# Patient Record
Sex: FEMALE | Race: WHITE | NOT HISPANIC OR LATINO | Employment: OTHER | URBAN - NONMETROPOLITAN AREA
[De-identification: names, ages, dates, MRNs, and addresses within clinical notes are randomized per-mention and may not be internally consistent; named-entity substitution may affect disease eponyms.]

---

## 2017-02-16 ENCOUNTER — APPOINTMENT (OUTPATIENT)
Dept: CT IMAGING | Facility: HOSPITAL | Age: 82
End: 2017-02-16

## 2017-02-16 ENCOUNTER — HOSPITAL ENCOUNTER (EMERGENCY)
Facility: HOSPITAL | Age: 82
Discharge: HOME | End: 2017-02-16
Attending: FAMILY MEDICINE | Admitting: FAMILY MEDICINE

## 2017-02-16 ENCOUNTER — APPOINTMENT (OUTPATIENT)
Dept: GENERAL RADIOLOGY | Facility: HOSPITAL | Age: 82
End: 2017-02-16

## 2017-02-16 VITALS
OXYGEN SATURATION: 98 % | HEIGHT: 64 IN | BODY MASS INDEX: 16.73 KG/M2 | TEMPERATURE: 98.2 F | HEART RATE: 81 BPM | DIASTOLIC BLOOD PRESSURE: 68 MMHG | WEIGHT: 98 LBS | SYSTOLIC BLOOD PRESSURE: 145 MMHG | RESPIRATION RATE: 18 BRPM

## 2017-02-16 DIAGNOSIS — S52.501A RADIUS AND ULNA DISTAL FRACTURE, RIGHT, CLOSED, INITIAL ENCOUNTER: Primary | ICD-10-CM
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
## 2017-02-16 DIAGNOSIS — S52.601A RADIUS AND ULNA DISTAL FRACTURE, RIGHT, CLOSED, INITIAL ENCOUNTER: Primary | ICD-10-CM

## 2017-02-16 DIAGNOSIS — S02.2XXA CLOSED FRACTURE OF NASAL BONE, INITIAL ENCOUNTER: ICD-10-CM

## 2017-02-16 PROCEDURE — 70486 CT MAXILLOFACIAL W/O DYE: CPT

## 2017-02-16 PROCEDURE — 70450 CT HEAD/BRAIN W/O DYE: CPT

## 2017-02-16 PROCEDURE — 99284 EMERGENCY DEPT VISIT MOD MDM: CPT

## 2017-02-16 PROCEDURE — 73110 X-RAY EXAM OF WRIST: CPT

## 2017-02-16 PROCEDURE — 72125 CT NECK SPINE W/O DYE: CPT

## 2017-02-16 RX ORDER — TRAMADOL HYDROCHLORIDE 50 MG/1
50 TABLET ORAL ONCE
Status: COMPLETED | OUTPATIENT
Start: 2017-02-16 | End: 2017-02-16

## 2017-02-16 RX ORDER — BACITRACIN ZINC 500 [USP'U]/G
1 OINTMENT TOPICAL ONCE
Status: DISCONTINUED | OUTPATIENT
Start: 2017-02-16 | End: 2017-02-16 | Stop reason: HOSPADM

## 2017-02-16 RX ORDER — ACETAMINOPHEN 500 MG
1000 TABLET ORAL ONCE
Status: COMPLETED | OUTPATIENT
Start: 2017-02-16 | End: 2017-02-16

## 2017-02-16 RX ORDER — TRAMADOL HYDROCHLORIDE 50 MG/1
50 TABLET ORAL EVERY 8 HOURS PRN
Qty: 20 TABLET | Refills: 0 | Status: SHIPPED | OUTPATIENT
Start: 2017-02-16 | End: 2018-06-28

## 2017-02-16 RX ORDER — AMOXICILLIN 500 MG/1
500 CAPSULE ORAL ONCE
Status: COMPLETED | OUTPATIENT
Start: 2017-02-16 | End: 2017-02-16

## 2017-02-16 RX ORDER — AMOXICILLIN 500 MG/1
1000 CAPSULE ORAL 3 TIMES DAILY
Qty: 30 CAPSULE | Refills: 0 | Status: SHIPPED | OUTPATIENT
Start: 2017-02-16 | End: 2018-06-28

## 2017-02-16 RX ADMIN — TRAMADOL HYDROCHLORIDE 50 MG: 50 TABLET, FILM COATED ORAL at 19:01

## 2017-02-16 RX ADMIN — AMOXICILLIN 500 MG: 500 CAPSULE ORAL at 19:01

## 2017-02-16 RX ADMIN — ACETAMINOPHEN 1000 MG: 500 TABLET ORAL at 19:01

## 2017-02-16 NOTE — ED PROVIDER NOTES
Subjective   HPI Comments: Presents per ems after fall while walking outside, onto [place]. Daughter present. States no LOC, she has dementia and not acting different from her nml. No vomiting.    Patient is a 89 y.o. female presenting with fall.   History provided by: daughter.  History limited by:  Dementia  Fall   Mechanism of injury: fall    Injury location:  Head/neck  Head/neck injury location:  Head  Arrived directly from scene: yes    Fall:     Fall occurred:  Walking    Impact surface:  [place]      Review of Systems   Constitutional: Negative.    Eyes: Negative.    Respiratory: Negative.    Cardiovascular: Negative.    Gastrointestinal: Negative.    Genitourinary: Negative.    Musculoskeletal: Positive for joint swelling.   Skin: Positive for wound.   Neurological: Negative.    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    • Shoulder pain        Allergies   Allergen Reactions   • Morphine And Related        Past Surgical History   Procedure Laterality Date   • Cataract extraction  01/11/2010     AFTER CATARACT LASER SURGERY 38451 (1)    YAG laser capsulotomy, right eye    • Other surgical history  04/17/2013     EXTENDED VISUAL FIELDS STUDY 08746 (Glaucoma, primary open angle)    • Injection of medication  09/21/2012     KENALOG (1)   • Other surgical history  01/24/2014     OCT DISC NFL 25371 (Primary open angle glaucoma   • Other surgical history  01/02/2013      "OCT SCAN RETINA 94354 (Macular degeneration)    • Pap smear  03/09/1994     PAP SMEAR (normal)   • Cataract extraction  06/14/2005     Remove cataract, insert lens (Cataract extraction with intraocular lens implantation, right eye)   • Cataract extraction  03/22/2005     Remove cataract, insert lens (cataract extraction with intraocular lens implantation, left eye)   • Other surgical history  08/27/2015     Remove Impacted Cerumen 10332 (Impacted cerumen) (5)   • Injection of medication  12/12/2014     Toradol (1)          History reviewed. No pertinent family history.    Social History     Social History   • Marital status:      Spouse name: N/A   • Number of children: N/A   • Years of education: N/A     Social History Main Topics   • Smoking status: Never Smoker   • Smokeless tobacco: None   • Alcohol use None   • Drug use: None   • Sexual activity: Not Asked     Other Topics Concern   • None     Social History Narrative           Objective   Physical Exam   Constitutional: She is oriented to person, place, and time. She appears well-developed and well-nourished.   HENT:   Head: Normocephalic.   Abrasions to face, right head/nose, right eye with ecchymosis and edema   Eyes: EOM are normal. Pupils are equal, round, and reactive to light.   Neck: Normal range of motion. Neck supple.   Cardiovascular: Normal rate.    Pulmonary/Chest: Effort normal and breath sounds normal.   Abdominal: Soft. Bowel sounds are normal.   Neurological: She is alert and oriented to person, place, and time.   Skin: Skin is warm and dry.   Nursing note and vitals reviewed.    Visit Vitals   • /86   • Pulse 83   • Temp 98.2 °F (36.8 °C) (Oral)   • Resp 18   • Ht 64\" (162.6 cm)   • Wt 98 lb (44.5 kg)   • SpO2 91%   • BMI 16.82 kg/m2         Splint Application  Date/Time: 2/16/2017 7:51 PM  Performed by: LINDA DANIELS  Authorized by: RAYMUNDO ZHENG   Consent: Verbal consent obtained.  Consent given by: guardian  Location " details: right arm  Splint type: sugar tong  Supplies used: cotton padding,  elastic bandage and Ortho-Glass  Post-procedure: The splinted body part was neurovascularly unchanged following the procedure.  Patient tolerance: Patient tolerated the procedure well with no immediate complications               ED Course  ED Course      CT Cervical Spine Without Contrast   Final Result   CONCLUSION:       1.  Osteoporosis.2.  Multilevel degenerative disc disease and   degenerative arthropathy of the cervical spine. 3.    Anterolisthesis at C3-4 and C4-5. 4.  Mild old compression   fractures of C5, C6 and C7.      Electronically signed by:  Christelle Ramsey MD  2/16/2017 5:49 PM CST   Workstation: [employer]      CT Head Without Contrast   Final Result   1.  No evidence of intracranial hemorrhage, mass effect or large acute infarct.   2.  Subcutaneous hematoma visualized anterior to the frontal bones, asymmetric to the right, as well as soft tissue swelling overlying the right orbit and nasal bridge.    3.  Mildly comminuted nasal bone fracture.      Electronically signed by:  Carlos Bennett MD  2/16/2017 5:24 PM CST         CT Facial Bones Without Contrast   Final Result   CONCLUSION:     1.  Mildly comminuted nasal bone fracture with overlying soft tissue swelling.   2.  Soft tissue swelling overlying the right orbit and frontal subcutaneous hematoma asymmetric to the right.   3.  Paranasal sinus disease as described above.   4.  Severe multilevel degenerative changes of the cervical spine.      Electronically signed by:  Carlos Bennett MD  2/16/2017 5:21 PM CST         XR Wrist 3+ View Right   Final Result   Conclusion: Comminuted slightly impacted fracture distal radius with intra-articular extension. Nondisplaced fracture ulnar styloid.      Electronically signed by:  Eugene Hawkins  2/16/2017 4:29 PM CST                         MDM  Number of Diagnoses or Management Options  Closed fracture of nasal bone, initial encounter:    Fall, initial encounter:   Radius and ulna distal fracture, right, closed, initial encounter:   Diagnosis management comments: RIZWANA [id number]  D/W Dr Quintero pt 's fractures, will give amoxicillin due to fx nasal bones. She will follow up with ortho and ENT. Sugar tong splint for right wrist.      Final diagnoses:   Radius and ulna distal fracture, right, closed, initial encounter   Closed fracture of nasal bone, initial encounter   Fall, initial encounter            Patti Beal, APRN  02/16/17 0696

## 2017-02-17 NOTE — DISCHARGE INSTRUCTIONS
Cold pack to face as tolerated 3 - 4 times daily.  Pain med as needed.  Follow up with ENT and ORTHO

## 2017-02-24 ENCOUNTER — OFFICE VISIT (OUTPATIENT)
Dept: ORTHOPEDIC SURGERY | Facility: CLINIC | Age: 82
End: 2017-02-24

## 2017-02-24 VITALS — WEIGHT: 98 LBS | HEIGHT: 64 IN | BODY MASS INDEX: 16.73 KG/M2

## 2017-02-24 DIAGNOSIS — S52.609A: Primary | ICD-10-CM

## 2017-02-24 DIAGNOSIS — S52.509A: Primary | ICD-10-CM

## 2017-02-24 PROCEDURE — 99203 OFFICE O/P NEW LOW 30 MIN: CPT | Performed by: NURSE PRACTITIONER

## 2017-02-24 PROCEDURE — 25600 CLTX DST RDL FX/EPHYS SEP WO: CPT | Performed by: NURSE PRACTITIONER

## 2017-02-24 NOTE — PROGRESS NOTES
Marisa Luke is a 89 y.o. female   Primary provider:  Isaías Amador MD       Chief Complaint   Patient presents with   • Right Wrist - Establish Care, Pain   • Results     repeat xrays done today in office       HISTORY OF PRESENT ILLNESS:    Pain   This is a new problem. The current episode started in the past 7 days. The problem occurs constantly. The problem has been unchanged. Nothing aggravates the symptoms. She has tried nothing for the symptoms. The treatment provided mild relief.        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    • Shoulder pain        Allergies   Allergen Reactions   • Morphine And Related          Current Outpatient Prescriptions:   •  acetaminophen (TYLENOL) 325 MG tablet, Take 650 mg by mouth Every 4 (Four) Hours As Needed for mild pain (1-3)., Disp: , Rfl:   •  amoxicillin (AMOXIL) 500 MG capsule, Take 2 capsules by mouth 3 (Three) Times a Day., Disp: 30 capsule, Rfl: 0  •  cycloSPORINE (RESTASIS) 0.05 % ophthalmic emulsion, Administer 1 drop to both eyes 2 (Two) Times a Day., Disp: , Rfl:   •  docusate sodium (COLACE) 100 MG capsule, Take 100 mg by mouth 2 (Two) Times a Day., Disp: , Rfl:   •  dorzolamide-timolol (COSOPT) 22.3-6.8 MG/ML ophthalmic solution, Administer 1 drop to both eyes 2 (Two) Times a Day., Disp: , Rfl:   •  ferrous sulfate 325 (65 FE) MG tablet, Take 325 mg by mouth Daily., Disp: , Rfl:   •  latanoprost (XALATAN) 0.005 % ophthalmic solution,  Administer 1 drop to both eyes Every Night., Disp: , Rfl:   •  LevoFLOXacin (LEVAQUIN PO), Take 1 tablet by mouth Daily., Disp: , Rfl:   •  LORazepam (ATIVAN) 0.5 MG tablet, Take 0.5 mg by mouth 2 (Two) Times a Day., Disp: , Rfl:   •  Multiple Vitamins-Minerals (PRESERVISION AREDS PO), Take 1 tablet by mouth 2 (Two) Times a Day., Disp: , Rfl:   •  propranolol (INDERAL) 20 MG tablet, Take 20 mg by mouth 2 (Two) Times a Day., Disp: , Rfl:   •  traMADol (ULTRAM) 50 MG tablet, Take 1 tablet by mouth Every 8 (Eight) Hours As Needed for moderate pain (4-6)., Disp: 20 tablet, Rfl: 0  •  traZODone (DESYREL) 50 MG tablet, Take 50 mg by mouth At Night As Needed for sleep., Disp: , Rfl:     Past Surgical History   Procedure Laterality Date   • Cataract extraction  01/11/2010     AFTER CATARACT LASER SURGERY 95043 (1)    YAG laser capsulotomy, right eye    • Other surgical history  04/17/2013     EXTENDED VISUAL FIELDS STUDY 72309 (Glaucoma, primary open angle)    • Injection of medication  09/21/2012     KENALOG (1)   • Other surgical history  01/24/2014     OCT DISC NFL 87969 (Primary open angle glaucoma   • Other surgical history  01/02/2013     OCT SCAN RETINA 62889 (Macular degeneration)    • Pap smear  03/09/1994     PAP SMEAR (normal)   • Cataract extraction  06/14/2005     Remove cataract, insert lens (Cataract extraction with intraocular lens implantation, right eye)   • Cataract extraction  03/22/2005     Remove cataract, insert lens (cataract extraction with intraocular lens implantation, left eye)   • Other surgical history  08/27/2015     Remove Impacted Cerumen 43268 (Impacted cerumen) (5)   • Injection of medication  12/12/2014     Toradol (1)          History reviewed. No pertinent family history.     Social History     Social History   • Marital status:      Spouse name: N/A   • Number of children: N/A   • Years of education: N/A     Occupational History   • Not on file.     Social History Main Topics  "  • Smoking status: Never Smoker   • Smokeless tobacco: Not on file   • Alcohol use Not on file   • Drug use: Not on file   • Sexual activity: Not on file     Other Topics Concern   • Not on file     Social History Narrative        Review of Systems   Psychiatric/Behavioral: Positive for decreased concentration and sleep disturbance.   All other systems reviewed and are negative.      PHYSICAL EXAMINATION:       Visit Vitals   • Ht 64\" (162.6 cm)   • Wt 98 lb (44.5 kg)   • BMI 16.82 kg/m2       Physical Exam   Constitutional: She is oriented to person, place, and time. Vital signs are normal. She appears well-developed and well-nourished. She is cooperative.   HENT:   Head: Normocephalic and atraumatic.   Neck: Trachea normal and phonation normal.   Pulmonary/Chest: Effort normal. No respiratory distress.   Abdominal: Soft. Normal appearance. She exhibits no distension.   Neurological: She is alert and oriented to person, place, and time. GCS eye subscore is 4. GCS verbal subscore is 5. GCS motor subscore is 6.   Skin: Skin is warm, dry and intact.   Psychiatric: She has a normal mood and affect. Her speech is normal and behavior is normal. Judgment and thought content normal. Cognition and memory are normal.   Vitals reviewed.      GAIT:     [x]  Normal  []  Antalgic    Assistive device: [x]  None  []  Walker     []  Crutches  []  Cane     []  Wheelchair  []  Stretcher    Right Hand Exam     Tenderness   The patient is experiencing tenderness in the radial area and ulnar area.    Range of Motion     Wrist   Extension: abnormal   Flexion: abnormal   Pronation: abnormal   Supination: abnormal     Muscle Strength   Right wrist normal muscle strength: deferred.    Other   Erythema: absent  Scars: absent  Sensation: normal  Pulse: present      Left Hand Exam   Left hand exam is normal.              Xr Wrist 3+ View Right    Result Date: 2/24/2017  Narrative: AP lateral and oblique view of the wrist reveals a displaced " distal radial and ulnar styloid fracture without any other acute bony abnormality noted.  Comparisons made to previous films from last week which reveal no change in the fracture alignment.    Xr Wrist 3+ View Right    Result Date: 2/16/2017  Narrative: Procedure:  Right wrist. Indication:  Right wrist pain. Trauma, patient fell Technique:  Four views. Prior relevant exam:  None. Comminuted slightly impacted closed traumatic fracture distal radius with intra-articular extension. Nondisplaced fracture ulnar styloid.     Impression: Conclusion: Comminuted slightly impacted fracture distal radius with intra-articular extension. Nondisplaced fracture ulnar styloid. Electronically signed by:  Eugene Hawkins  2/16/2017 4:29 PM CST             ASSESSMENT:    Diagnoses and all orders for this visit:    Radius and ulna distal fracture, unspecified laterality, closed, initial encounter          PLAN  Recommend conservative tx of the distal radial fracture and a fiberglass short arm cast was applied.  Recommended f/u in3 week for recheck and repeat xrays in cast.   No Follow-up on file.    Rex Lopez, APRN

## 2017-03-15 ENCOUNTER — OFFICE VISIT (OUTPATIENT)
Dept: ORTHOPEDIC SURGERY | Facility: CLINIC | Age: 82
End: 2017-03-15

## 2017-03-15 VITALS — HEIGHT: 64 IN | BODY MASS INDEX: 16.73 KG/M2 | WEIGHT: 98 LBS

## 2017-03-15 DIAGNOSIS — S52.509D: Primary | ICD-10-CM

## 2017-03-15 DIAGNOSIS — S52.609D: Primary | ICD-10-CM

## 2017-03-15 PROCEDURE — 99024 POSTOP FOLLOW-UP VISIT: CPT | Performed by: NURSE PRACTITIONER

## 2017-03-15 NOTE — PROGRESS NOTES
Marisa Luke is a 89 y.o. female returns for     Chief Complaint   Patient presents with   • Right Wrist - Fracture     Repeat xray today in office.        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    • Shoulder pain        Allergies   Allergen Reactions   • Morphine And Related          Current Outpatient Prescriptions:   •  acetaminophen (TYLENOL) 325 MG tablet, Take 650 mg by mouth Every 4 (Four) Hours As Needed for mild pain (1-3)., Disp: , Rfl:   •  amoxicillin (AMOXIL) 500 MG capsule, Take 2 capsules by mouth 3 (Three) Times a Day., Disp: 30 capsule, Rfl: 0  •  cycloSPORINE (RESTASIS) 0.05 % ophthalmic emulsion, Administer 1 drop to both eyes 2 (Two) Times a Day., Disp: , Rfl:   •  docusate sodium (COLACE) 100 MG capsule, Take 100 mg by mouth 2 (Two) Times a Day., Disp: , Rfl:   •  dorzolamide-timolol (COSOPT) 22.3-6.8 MG/ML ophthalmic solution, Administer 1 drop to both eyes 2 (Two) Times a Day., Disp: , Rfl:   •  ferrous sulfate 325 (65 FE) MG tablet, Take 325 mg by mouth Daily., Disp: , Rfl:   •  latanoprost (XALATAN) 0.005 % ophthalmic solution, Administer 1 drop to both eyes Every Night., Disp: , Rfl:   •  LevoFLOXacin (LEVAQUIN PO), Take 1 tablet by mouth Daily., Disp: , Rfl:   •  LORazepam (ATIVAN) 0.5 MG tablet, Take 0.5 mg by mouth 2 (Two) Times a Day., Disp: , Rfl:   •  Multiple Vitamins-Minerals (PRESERVISION AREDS PO), Take 1 tablet by mouth 2  "(Two) Times a Day., Disp: , Rfl:   •  propranolol (INDERAL) 20 MG tablet, Take 20 mg by mouth 2 (Two) Times a Day., Disp: , Rfl:   •  traMADol (ULTRAM) 50 MG tablet, Take 1 tablet by mouth Every 8 (Eight) Hours As Needed for moderate pain (4-6)., Disp: 20 tablet, Rfl: 0  •  traZODone (DESYREL) 50 MG tablet, Take 50 mg by mouth At Night As Needed for sleep., Disp: , Rfl:     Past Surgical History   Procedure Laterality Date   • Cataract extraction  01/11/2010     AFTER CATARACT LASER SURGERY 52834 (1)    YAG laser capsulotomy, right eye    • Other surgical history  04/17/2013     EXTENDED VISUAL FIELDS STUDY 07917 (Glaucoma, primary open angle)    • Injection of medication  09/21/2012     KENALOG (1)   • Other surgical history  01/24/2014     OCT DISC NFL 97553 (Primary open angle glaucoma   • Other surgical history  01/02/2013     OCT SCAN RETINA 43941 (Macular degeneration)    • Pap smear  03/09/1994     PAP SMEAR (normal)   • Cataract extraction  06/14/2005     Remove cataract, insert lens (Cataract extraction with intraocular lens implantation, right eye)   • Cataract extraction  03/22/2005     Remove cataract, insert lens (cataract extraction with intraocular lens implantation, left eye)   • Other surgical history  08/27/2015     Remove Impacted Cerumen 47356 (Impacted cerumen) (5)   • Injection of medication  12/12/2014     Toradol (1)          ROS  No fevers or chills.  No chest pain or shortness of air.  No GI or  disturbances.    PHYSICAL EXAMINATION:       Visit Vitals   • Ht 64\" (162.6 cm)   • Wt 98 lb (44.5 kg)   • BMI 16.82 kg/m2       Physical Exam   Constitutional: Vital signs are normal. She appears well-developed and well-nourished. She is cooperative.   HENT:   Head: Normocephalic and atraumatic.   Neck: Trachea normal and phonation normal.   Pulmonary/Chest: Effort normal. No respiratory distress.   Abdominal: Soft. Normal appearance. She exhibits no distension.   Neurological: She is alert. She " is disoriented. GCS eye subscore is 4. GCS verbal subscore is 5. GCS motor subscore is 6.   Skin: Skin is warm, dry and intact.   Psychiatric: She has a normal mood and affect. Her speech is normal and behavior is normal. Judgment and thought content normal. Cognition and memory are normal.   Vitals reviewed.      GAIT:     []  Normal  []  Antalgic    Assistive device: []  None  []  Walker     []  Crutches  []  Cane     []  Wheelchair  []  Stretcher    Right Hand Exam     Comments:  Cast in place no neurovascular compromise noted.      Left Hand Exam   Left hand exam is normal.              Xr Wrist 3+ View Right    Result Date: 3/16/2017  Narrative: AP lateral oblique view of the wrist reveals a displaced distal radial fracture without any other acute bony abnormality noted.    Xr Wrist 3+ View Right    Result Date: 2/24/2017  Narrative: AP lateral and oblique view of the wrist reveals a displaced distal radial and ulnar styloid fracture without any other acute bony abnormality noted.  Comparisons made to previous films from last week which reveal no change in the fracture alignment.    Xr Wrist 3+ View Right    Result Date: 2/16/2017  Narrative: Procedure:  Right wrist. Indication:  Right wrist pain. Trauma, patient fell Technique:  Four views. Prior relevant exam:  None. Comminuted slightly impacted closed traumatic fracture distal radius with intra-articular extension. Nondisplaced fracture ulnar styloid.     Impression: Conclusion: Comminuted slightly impacted fracture distal radius with intra-articular extension. Nondisplaced fracture ulnar styloid. Electronically signed by:  Eugene Hawkins  2/16/2017 4:29 PM CST     Ct Head Without Contrast    Result Date: 2/16/2017  Narrative: [facility] [place] Patient Name: MS. OMER BOOTHE ORDERING: LINDA DANIELS ATTENDING: RAYMUNDO ZHENG REFERRING: LINDA DANIELS ----------------------- EXAMINATION:  CT SCAN OF THE HEAD WITHOUT INTRAVENOUS CONTRAST CLINICAL  INFORMATION:  fall with head injury DOSE LENGTH PRODUCT: 2051.8 This exam was performed using radiation doses that are as low as reasonably achievable (ALARA). COMPARISON: None available. TECHNIQUE:  Axial images from skull base to vertex.  FINDINGS: Exam is limited due to patient motion artifact and patient positioning. Foci of decreased attenuation are seen in the periventricular white matter, likely due to microvascular ischemia. There are involutional changes involving the cerebral sulci and ventricles. Subcutaneous hematoma visualized anterior to the frontal bones, asymmetric to the right, as well as soft tissue swelling overlying the right orbit and nasal bridge. Mildly comminuted nasal bone fracture. There is no evidence of intracranial hemorrhage, parenchymal mass, midline shift, or focal mass effect. There is no hydrocephalus or effacement of the basilar cisterns. There is no extra-axial hemorrhage or collection identified. The mastoid air cells and visualized paranasal sinuses appear clear.       Impression: 1.  No evidence of intracranial hemorrhage, mass effect or large acute infarct. 2.  Subcutaneous hematoma visualized anterior to the frontal bones, asymmetric to the right, as well as soft tissue swelling overlying the right orbit and nasal bridge. 3.  Mildly comminuted nasal bone fracture. Electronically signed by:  Carlos Bennett MD  2/16/2017 5:24 PM CST     Ct Cervical Spine Without Contrast    Result Date: 2/16/2017  Narrative: Patient Name:  MS. OMER WHEAT CROWEPatient ID:  [id number] Ordering: LINDA DANIELSAttending:  RAYMUNDO ZHENGReferring:  LINDA DANIELS------------------------------------------------DATE OF PROCEDURE:  2/16/2017 4:52 PM CST CERVICAL SPINE CT INDICATION FOR PROCEDURE: A   89 years-old patient presents for evaluation of neck pain after fall.. TECHNIQUE: Contiguous axial images were obtained from the skull base to T3.  Multiplanar reformations are submitted for interpretation.  Dose length product is 172  Images were acquired in accordance with the principles of ALARA (as low as reasonably allowable). COMPARISON: CT of the cervical spine dated October 11, 2015. FINDINGS: The bones appear osteopenic.  There is mild compression of the C5, C6 and C7 vertebral bodies, similar to the previous CT. There is normal lordosis. Intervertebral disc spaces are narrowed at C5-6 and C6-7 discs. There is a mild anterolisthesis at C3-4 and C4-5. There are degenerative changes of the anterior atlantoaxial articulation. Atlanto-axial and atlanto-occipital relationships are within normal limits.  The neuroforamina are narrowed at multiple levels. There is multilevel degenerative arthropathy of the uncovertebral and facet joints.. Nasopharynx, oropharynx, hypopharynx and larynx have a normal appearance.  Lung apices are clear. There is heterogeneous attenuation of thyroid.     Impression: CONCLUSION: 1.  Osteoporosis.2.  Multilevel degenerative disc disease and degenerative arthropathy of the cervical spine. 3. Anterolisthesis at C3-4 and C4-5. 4.  Mild old compression fractures of C5, C6 and C7. Electronically signed by:  Christelle Ramsey MD  2/16/2017 5:49 PM CST Workstation: [id number]    Ct Facial Bones Without Contrast    Result Date: 2/16/2017  Narrative: [facility] Patient Name: OMER BOOTHE ORDERING: LINDA DANIELS ATTENDING: RAYMUNDO ZHENG REFERRING: LINDA DANIELS ----------------------- PROCEDURE: CT facial bones without contrast COMPARISON: Head CT May 25, 2016 HISTORY: Injury, pain TECHNIQUE: Multiple contiguous noncontrast axial images are obtained through the facial bones with coronal and sagittal multiplanar reformatted images also reviewed. The dose length product is 171.8 FINDINGS: There is a mildly comminuted irregular fracture of the nasal bones with overlying soft tissue swelling. There are severe multilevel degenerative changes of the cervical spine. There is soft tissue  swelling overlying the right orbit. There is a frontal subcutaneous hematoma, asymmetric to the right. Mild mucoperiosteal thickening affects the bilateral maxillary sinuses and ethmoid air cells.     Impression: CONCLUSION:  1.  Mildly comminuted nasal bone fracture with overlying soft tissue swelling. 2.  Soft tissue swelling overlying the right orbit and frontal subcutaneous hematoma asymmetric to the right. 3.  Paranasal sinus disease as described above. 4.  Severe multilevel degenerative changes of the cervical spine. Electronically signed by:  Carlos Bennett MD  2/16/2017 5:21 PM CST             ASSESSMENT:    Diagnoses and all orders for this visit:    Radius and ulna distal fracture, unspecified laterality, closed, with routine healing, subsequent encounter          PLAN  Recommend that the patient remain in the cast for 3 more weeks and follow-up for x-rays out of the cast at the next visit.  No Follow-up on file.    Rex Lopez, APRN

## 2017-04-05 ENCOUNTER — OFFICE VISIT (OUTPATIENT)
Dept: ORTHOPEDIC SURGERY | Facility: CLINIC | Age: 82
End: 2017-04-05

## 2017-04-05 DIAGNOSIS — S52.601D RADIUS AND ULNA DISTAL FRACTURE, RIGHT, CLOSED, WITH ROUTINE HEALING, SUBSEQUENT ENCOUNTER: Primary | ICD-10-CM

## 2017-04-05 DIAGNOSIS — S52.501D RADIUS AND ULNA DISTAL FRACTURE, RIGHT, CLOSED, WITH ROUTINE HEALING, SUBSEQUENT ENCOUNTER: Primary | ICD-10-CM

## 2017-04-05 PROCEDURE — 99024 POSTOP FOLLOW-UP VISIT: CPT | Performed by: NURSE PRACTITIONER

## 2017-04-05 PROCEDURE — 29085 APPL CAST HAND&LWR FOREARM: CPT | Performed by: NURSE PRACTITIONER

## 2017-04-05 NOTE — PROGRESS NOTES
Marisa Luke is a 89 y.o. female returns for     Chief Complaint   Patient presents with   • Right Wrist - Fracture   • Cast Removal       HISTORY OF PRESENT ILLNESS: cast removed repeat xrays done today.        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    • Essential hypertension    • Fracture of thoracic spine      OLD THORACICCOMPRESSION FRACTURES      • Hearing loss    • Impacted cerumen    • Keratoconjunctivitis sicca     - exacerbated      • Low back strain    • Nonexudative age-related macular degeneration    • Pain in eye    • Primary open angle glaucoma    • Prolapse of vaginal walls without uterine prolapse    • Shoulder pain        Allergies   Allergen Reactions   • Morphine And Related          Current Outpatient Prescriptions:   •  acetaminophen (TYLENOL) 325 MG tablet, Take 650 mg by mouth Every 4 (Four) Hours As Needed for mild pain (1-3)., Disp: , Rfl:   •  amoxicillin (AMOXIL) 500 MG capsule, Take 2 capsules by mouth 3 (Three) Times a Day., Disp: 30 capsule, Rfl: 0  •  cycloSPORINE (RESTASIS) 0.05 % ophthalmic emulsion, Administer 1 drop to both eyes 2 (Two) Times a Day., Disp: , Rfl:   •  docusate sodium (COLACE) 100 MG capsule, Take 100 mg by mouth 2 (Two) Times a Day., Disp: , Rfl:   •  dorzolamide-timolol (COSOPT) 22.3-6.8 MG/ML ophthalmic solution, Administer 1 drop to both eyes 2 (Two) Times a Day., Disp: , Rfl:   •  ferrous sulfate 325 (65 FE) MG tablet, Take 325 mg by mouth Daily., Disp: , Rfl:   •  latanoprost (XALATAN) 0.005 % ophthalmic solution, Administer 1 drop to both eyes Every Night., Disp: , Rfl:   •  LevoFLOXacin (LEVAQUIN PO), Take 1 tablet by mouth Daily., Disp: , Rfl:   •  LORazepam (ATIVAN) 0.5 MG tablet, Take 0.5 mg by mouth 2 (Two) Times a Day., Disp: , Rfl:   •  Multiple Vitamins-Minerals (PRESERVISION AREDS PO), Take 1  tablet by mouth 2 (Two) Times a Day., Disp: , Rfl:   •  propranolol (INDERAL) 20 MG tablet, Take 20 mg by mouth 2 (Two) Times a Day., Disp: , Rfl:   •  traMADol (ULTRAM) 50 MG tablet, Take 1 tablet by mouth Every 8 (Eight) Hours As Needed for moderate pain (4-6)., Disp: 20 tablet, Rfl: 0  •  traZODone (DESYREL) 50 MG tablet, Take 50 mg by mouth At Night As Needed for sleep., Disp: , Rfl:     Past Surgical History:   Procedure Laterality Date   • CATARACT EXTRACTION  01/11/2010    AFTER CATARACT LASER SURGERY 85665 (1)    YAG laser capsulotomy, right eye    • CATARACT EXTRACTION  06/14/2005    Remove cataract, insert lens (Cataract extraction with intraocular lens implantation, right eye)   • CATARACT EXTRACTION  03/22/2005    Remove cataract, insert lens (cataract extraction with intraocular lens implantation, left eye)   • INJECTION OF MEDICATION  09/21/2012    KENALOG (1)   • INJECTION OF MEDICATION  12/12/2014    Toradol (1)      • OTHER SURGICAL HISTORY  04/17/2013    EXTENDED VISUAL FIELDS STUDY 13310 (Glaucoma, primary open angle)    • OTHER SURGICAL HISTORY  01/24/2014    OCT DISC NFL 10440 (Primary open angle glaucoma   • OTHER SURGICAL HISTORY  01/02/2013    OCT SCAN RETINA 32387 (Macular degeneration)    • OTHER SURGICAL HISTORY  08/27/2015    Remove Impacted Cerumen 01969 (Impacted cerumen) (5)   • PAP SMEAR  03/09/1994    PAP SMEAR (normal)       ROS  No fevers or chills.  No chest pain or shortness of air.  No GI or  disturbances.    PHYSICAL EXAMINATION:       There were no vitals taken for this visit.    Physical Exam   Constitutional: Vital signs are normal. She appears well-developed and well-nourished. She is cooperative.   HENT:   Head: Normocephalic and atraumatic.   Neck: Trachea normal and phonation normal.   Pulmonary/Chest: Effort normal. No respiratory distress.   Abdominal: Soft. Normal appearance. She exhibits no distension.   Neurological: She is alert. She is disoriented. GCS eye  subscore is 4. GCS verbal subscore is 5. GCS motor subscore is 6.   Skin: Skin is warm, dry and intact.   Psychiatric: She has a normal mood and affect. Her speech is normal and behavior is normal. Judgment and thought content normal. Cognition and memory are normal.   Vitals reviewed.      GAIT:     []  Normal  []  Antalgic    Assistive device: [x]  None  []  Walker     []  Crutches  []  Cane     []  Wheelchair  []  Stretcher    Right Hand Exam     Tenderness   The patient is experiencing no tenderness.         Range of Motion     Wrist   Extension: abnormal   Flexion: abnormal   Pronation: abnormal   Supination: abnormal     Muscle Strength   Right wrist normal muscle strength: deferred.    Other   Erythema: absent  Scars: absent  Sensation: normal  Pulse: present      Left Hand Exam   Left hand exam is normal.              Xr Wrist 3+ View Right    Result Date: 4/5/2017  Narrative: AP lateral oblique view of the right wrist reveals a stable distal radial fracture.    Xr Wrist 3+ View Right    Result Date: 3/16/2017  Narrative: AP lateral oblique view of the wrist reveals a displaced distal radial fracture without any other acute bony abnormality noted.            ASSESSMENT:    Diagnoses and all orders for this visit:    Radius and ulna distal fracture, right, closed, with routine healing, subsequent encounter          PLAN  I recommend splinting of the right wrist at this time with a wrist controlled splint.  Splint was applied in the office today before the patient left and the daughter was instructed on continued care.  She verbalizes understanding of the care and the discharge instruction.  Recommend follow-up in 4-6 weeks for repeat x-ray.  No Follow-up on file.    SHADIA Flores

## 2017-05-10 ENCOUNTER — OFFICE VISIT (OUTPATIENT)
Dept: ORTHOPEDIC SURGERY | Facility: CLINIC | Age: 82
End: 2017-05-10

## 2017-05-10 VITALS — HEIGHT: 64 IN | WEIGHT: 98 LBS | BODY MASS INDEX: 16.73 KG/M2

## 2017-05-10 DIAGNOSIS — S52.501D RADIUS AND ULNA DISTAL FRACTURE, RIGHT, CLOSED, WITH ROUTINE HEALING, SUBSEQUENT ENCOUNTER: Primary | ICD-10-CM

## 2017-05-10 DIAGNOSIS — S52.601D RADIUS AND ULNA DISTAL FRACTURE, RIGHT, CLOSED, WITH ROUTINE HEALING, SUBSEQUENT ENCOUNTER: Primary | ICD-10-CM

## 2017-05-10 PROCEDURE — 99024 POSTOP FOLLOW-UP VISIT: CPT | Performed by: NURSE PRACTITIONER

## 2018-02-06 ENCOUNTER — LAB REQUISITION (OUTPATIENT)
Dept: LAB | Facility: HOSPITAL | Age: 83
End: 2018-02-06

## 2018-02-06 DIAGNOSIS — R82.90 ABNORMAL FINDING IN URINE: ICD-10-CM

## 2018-02-06 LAB
BILIRUB UR QL STRIP: NEGATIVE
CLARITY UR: CLEAR
COLOR UR: ABNORMAL
GLUCOSE UR STRIP-MCNC: NEGATIVE MG/DL
HGB UR QL STRIP.AUTO: NEGATIVE
KETONES UR QL STRIP: ABNORMAL
LEUKOCYTE ESTERASE UR QL STRIP.AUTO: ABNORMAL
NITRITE UR QL STRIP: NEGATIVE
PH UR STRIP.AUTO: 6 [PH] (ref 5–9)
PROT UR QL STRIP: ABNORMAL
SP GR UR STRIP: 1.02 (ref 1–1.03)
UROBILINOGEN UR QL STRIP: ABNORMAL

## 2018-02-06 PROCEDURE — 81003 URINALYSIS AUTO W/O SCOPE: CPT | Performed by: FAMILY MEDICINE

## 2018-02-06 PROCEDURE — 87086 URINE CULTURE/COLONY COUNT: CPT | Performed by: FAMILY MEDICINE

## 2018-02-08 LAB — BACTERIA SPEC AEROBE CULT: NO GROWTH

## 2018-02-09 ENCOUNTER — LAB REQUISITION (OUTPATIENT)
Dept: LAB | Facility: HOSPITAL | Age: 83
End: 2018-02-09

## 2018-02-09 DIAGNOSIS — M62.81 MUSCLE WEAKNESS (GENERALIZED): ICD-10-CM

## 2018-02-09 LAB
BASOPHILS # BLD AUTO: 0.01 10*3/MM3 (ref 0–0.2)
BASOPHILS NFR BLD AUTO: 0.2 % (ref 0–2)
DEPRECATED RDW RBC AUTO: 43.8 FL (ref 36.4–46.3)
EOSINOPHIL # BLD AUTO: 0.07 10*3/MM3 (ref 0–0.7)
EOSINOPHIL NFR BLD AUTO: 1.4 % (ref 0–7)
ERYTHROCYTE [DISTWIDTH] IN BLOOD BY AUTOMATED COUNT: 13.9 % (ref 11.5–14.5)
HCT VFR BLD AUTO: 30.6 % (ref 35–45)
HGB BLD-MCNC: 10.4 G/DL (ref 12–15.5)
IMM GRANULOCYTES # BLD: 0.02 10*3/MM3 (ref 0–0.02)
IMM GRANULOCYTES NFR BLD: 0.4 % (ref 0–0.5)
LYMPHOCYTES # BLD AUTO: 1.49 10*3/MM3 (ref 0.6–4.2)
LYMPHOCYTES NFR BLD AUTO: 30.7 % (ref 10–50)
MCH RBC QN AUTO: 29.1 PG (ref 26.5–34)
MCHC RBC AUTO-ENTMCNC: 34 G/DL (ref 31.4–36)
MCV RBC AUTO: 85.5 FL (ref 80–98)
MONOCYTES # BLD AUTO: 1.81 10*3/MM3 (ref 0–0.9)
MONOCYTES NFR BLD AUTO: 37.3 % (ref 0–12)
NEUTROPHILS # BLD AUTO: 1.45 10*3/MM3 (ref 2–8.6)
NEUTROPHILS NFR BLD AUTO: 30 % (ref 37–80)
PLATELET # BLD AUTO: 133 10*3/MM3 (ref 150–450)
PMV BLD AUTO: 11.6 FL (ref 8–12)
RBC # BLD AUTO: 3.58 10*6/MM3 (ref 3.77–5.16)
WBC NRBC COR # BLD: 4.85 10*3/MM3 (ref 3.2–9.8)

## 2018-02-09 PROCEDURE — 85025 COMPLETE CBC W/AUTO DIFF WBC: CPT | Performed by: FAMILY MEDICINE

## 2018-03-12 ENCOUNTER — APPOINTMENT (OUTPATIENT)
Dept: MRI IMAGING | Facility: HOSPITAL | Age: 83
End: 2018-03-12

## 2018-03-13 ENCOUNTER — OUTSIDE FACILITY SERVICE (OUTPATIENT)
Dept: FAMILY MEDICINE CLINIC | Facility: CLINIC | Age: 83
End: 2018-03-13

## 2018-03-13 PROCEDURE — 99309 SBSQ NF CARE MODERATE MDM 30: CPT | Performed by: FAMILY MEDICINE

## 2018-04-12 ENCOUNTER — OUTSIDE FACILITY SERVICE (OUTPATIENT)
Dept: FAMILY MEDICINE CLINIC | Facility: CLINIC | Age: 83
End: 2018-04-12

## 2018-04-12 PROCEDURE — 99308 SBSQ NF CARE LOW MDM 20: CPT | Performed by: FAMILY MEDICINE

## 2018-04-24 ENCOUNTER — OUTSIDE FACILITY SERVICE (OUTPATIENT)
Dept: FAMILY MEDICINE CLINIC | Facility: CLINIC | Age: 83
End: 2018-04-24

## 2018-04-24 PROCEDURE — 99307 SBSQ NF CARE SF MDM 10: CPT | Performed by: FAMILY MEDICINE

## 2018-05-08 ENCOUNTER — OUTSIDE FACILITY SERVICE (OUTPATIENT)
Dept: FAMILY MEDICINE CLINIC | Facility: CLINIC | Age: 83
End: 2018-05-08

## 2018-05-08 PROCEDURE — 99307 SBSQ NF CARE SF MDM 10: CPT | Performed by: FAMILY MEDICINE

## 2018-06-07 ENCOUNTER — OUTSIDE FACILITY SERVICE (OUTPATIENT)
Dept: FAMILY MEDICINE CLINIC | Facility: CLINIC | Age: 83
End: 2018-06-07

## 2018-06-07 PROCEDURE — 99308 SBSQ NF CARE LOW MDM 20: CPT | Performed by: FAMILY MEDICINE

## 2018-06-12 ENCOUNTER — OUTSIDE FACILITY SERVICE (OUTPATIENT)
Dept: FAMILY MEDICINE CLINIC | Facility: CLINIC | Age: 83
End: 2018-06-12

## 2018-06-12 PROCEDURE — 99307 SBSQ NF CARE SF MDM 10: CPT | Performed by: FAMILY MEDICINE

## 2018-06-28 ENCOUNTER — OFFICE VISIT (OUTPATIENT)
Dept: ORTHOPEDIC SURGERY | Facility: CLINIC | Age: 83
End: 2018-06-28

## 2018-06-28 DIAGNOSIS — S42.292A OTHER CLOSED DISPLACED FRACTURE OF PROXIMAL END OF LEFT HUMERUS, INITIAL ENCOUNTER: Primary | ICD-10-CM

## 2018-06-28 DIAGNOSIS — M79.602 LEFT ARM PAIN: ICD-10-CM

## 2018-06-28 DIAGNOSIS — F03.90 DEMENTIA WITHOUT BEHAVIORAL DISTURBANCE, UNSPECIFIED DEMENTIA TYPE: ICD-10-CM
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
## 2018-06-28 DIAGNOSIS — M79.622 LEFT UPPER ARM PAIN: Primary | ICD-10-CM

## 2018-06-28 PROCEDURE — 99214 OFFICE O/P EST MOD 30 MIN: CPT | Performed by: NURSE PRACTITIONER

## 2018-06-28 PROCEDURE — 23600 CLTX PROX HUMRL FX W/O MNPJ: CPT | Performed by: NURSE PRACTITIONER

## 2018-06-28 NOTE — PROGRESS NOTES
Marisa Luke is a 89 y.o. female   Primary provider:  Isaías Amador MD       Chief Complaint   Patient presents with   • Left Arm - Arm Injury       HISTORY OF PRESENT ILLNESS:     89-year-old  female's in the office today after a fall while at her residence in a nursing home and also [place].  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  Since the incident she's been immobilized in a sling comfort.    Arm Injury    The injury mechanism was a fall. The pain is present in the upper left arm and left forearm. The pain is mild. The pain has been intermittent since the incident. Associated symptoms comments: swelling. Nothing aggravates the symptoms. She has tried nothing for the symptoms.        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    • Essential hypertension    • Fracture of thoracic spine      OLD THORACICCOMPRESSION FRACTURES      • Hearing loss    • Impacted cerumen    • Keratoconjunctivitis sicca     - exacerbated      • Low back strain    • Nonexudative age-related macular degeneration    • Pain in eye    • Primary open angle glaucoma    • Prolapse of vaginal walls without uterine prolapse    • Shoulder pain        Allergies   Allergen Reactions   • Morphine And Related          Current Outpatient Prescriptions:   •  acetaminophen (TYLENOL) 325 MG tablet, Take 650 mg by mouth Every 4 (Four) Hours As Needed for mild pain (1-3)., Disp: , Rfl:   •  cycloSPORINE (RESTASIS) 0.05 % ophthalmic emulsion, Administer 1 drop to both eyes 2 (Two) Times a Day., Disp: , Rfl:   •  docusate sodium (COLACE) 100 MG capsule, Take 100 mg by mouth 2 (Two) Times a Day., Disp: , Rfl:   •   dorzolamide-timolol (COSOPT) 22.3-6.8 MG/ML ophthalmic solution, Administer 1 drop to both eyes 2 (Two) Times a Day., Disp: , Rfl:   •  ferrous sulfate 325 (65 FE) MG tablet, Take 325 mg by mouth Daily., Disp: , Rfl:   •  latanoprost (XALATAN) 0.005 % ophthalmic solution, Administer 1 drop to both eyes Every Night., Disp: , Rfl:   •  LORazepam (ATIVAN) 0.5 MG tablet, Take 0.5 mg by mouth 2 (Two) Times a Day., Disp: , Rfl:   •  Multiple Vitamins-Minerals (PRESERVISION AREDS PO), Take 1 tablet by mouth 2 (Two) Times a Day., Disp: , Rfl:     Past Surgical History:   Procedure Laterality Date   • CATARACT EXTRACTION  01/11/2010    AFTER CATARACT LASER SURGERY 76382 (1)    YAG laser capsulotomy, right eye    • CATARACT EXTRACTION  06/14/2005    Remove cataract, insert lens (Cataract extraction with intraocular lens implantation, right eye)   • CATARACT EXTRACTION  03/22/2005    Remove cataract, insert lens (cataract extraction with intraocular lens implantation, left eye)   • INJECTION OF MEDICATION  09/21/2012    KENALOG (1)   • INJECTION OF MEDICATION  12/12/2014    Toradol (1)      • OTHER SURGICAL HISTORY  04/17/2013    EXTENDED VISUAL FIELDS STUDY 12870 (Glaucoma, primary open angle)    • OTHER SURGICAL HISTORY  01/24/2014    OCT DISC NFL 21182 (Primary open angle glaucoma   • OTHER SURGICAL HISTORY  01/02/2013    OCT SCAN RETINA 88157 (Macular degeneration)    • OTHER SURGICAL HISTORY  08/27/2015    Remove Impacted Cerumen 10780 (Impacted cerumen) (5)   • PAP SMEAR  03/09/1994    PAP SMEAR (normal)       No family history on file.     Social History     Social History   • Marital status:      Spouse name: N/A   • Number of children: N/A   • Years of education: N/A     Occupational History   • Not on file.     Social History Main Topics   • Smoking status: Never Smoker   • Smokeless tobacco: Not on file   • Alcohol use Not on file   • Drug use: Unknown   • Sexual activity: Not on file     Other Topics Concern    • Not on file     Social History Narrative   • No narrative on file        Review of Systems   HENT: Positive for hearing loss.    Eyes:        Dry eyes   Musculoskeletal:        Left shoulder pain     Psychiatric/Behavioral: Positive for confusion and sleep disturbance. The patient is nervous/anxious.    All other systems reviewed and are negative.      PHYSICAL EXAMINATION:       There were no vitals taken for this visit.    Physical Exam   Constitutional: Vital signs are normal. She appears well-developed and well-nourished. She is cooperative.   HENT:   Head: Normocephalic and atraumatic.   Neck: Trachea normal and phonation normal.   Pulmonary/Chest: Effort normal. No respiratory distress.   Abdominal: Soft. Normal appearance. She exhibits no distension.   Neurological: She is alert. She is disoriented. GCS eye subscore is 4. GCS verbal subscore is 5. GCS motor subscore is 6.   Skin: Skin is warm, dry and intact.   Psychiatric: She has a normal mood and affect. Her speech is normal and behavior is normal. Judgment and thought content normal. Cognition and memory are normal.   Vitals reviewed.      GAIT:     []  Normal  []  Antalgic    Assistive device: []  None  []  Walker     []  Crutches  []  Cane     [x]  Wheelchair  []  Stretcher    Right Elbow Exam   Right elbow exam is normal.      Left Elbow Exam   Left elbow exam is normal.      Right Shoulder Exam   Right shoulder exam is normal.      Left Shoulder Exam     Comments:  Motion and strength testing deferred, skin is intact without any evidence of wounds and infection, capillary refills less than 2 seconds.              No results found.        ASSESSMENT:    Diagnoses and all orders for this visit:    Other closed displaced fracture of proximal end of left humerus, initial encounter    Left arm pain    Fall, initial encounter    Dementia without behavioral disturbance, unspecified dementia type          PLAN  After a long discussion with the daughter  concerning plan of treatment which includes both surgical and nonsurgical options patient's daughter has decided to pursue nonsurgical options and does not want to proceed with a splint at this time.  The plan will be for her to remain in the sling and a prescription of tramadol was given for comfort of which the patient has tolerated in the past.  Orders were written that the patient may be of with assistance for ambulation however there is no active and/or passive range of motion exercises to be performed with the left upper extremity.  I also agreed that we could order a mobile x-ray in 2-4 weeks for recheck if the daughter was swelling.  At this time they want to pursue comfort measures only for their mother who is suffering from end-stage dementia.  No Follow-up on file.    Rex Lopez, APRN

## 2018-08-07 ENCOUNTER — OUTSIDE FACILITY SERVICE (OUTPATIENT)
Dept: FAMILY MEDICINE CLINIC | Facility: CLINIC | Age: 83
End: 2018-08-07

## 2018-08-07 PROCEDURE — 99308 SBSQ NF CARE LOW MDM 20: CPT | Performed by: FAMILY MEDICINE

## 2018-09-06 ENCOUNTER — OUTSIDE FACILITY SERVICE (OUTPATIENT)
Dept: FAMILY MEDICINE CLINIC | Facility: CLINIC | Age: 83
End: 2018-09-06

## 2018-09-06 PROCEDURE — 99307 SBSQ NF CARE SF MDM 10: CPT | Performed by: FAMILY MEDICINE

## 2018-09-20 ENCOUNTER — OUTSIDE FACILITY SERVICE (OUTPATIENT)
Dept: FAMILY MEDICINE CLINIC | Facility: CLINIC | Age: 83
End: 2018-09-20

## 2018-09-20 PROCEDURE — 99307 SBSQ NF CARE SF MDM 10: CPT | Performed by: FAMILY MEDICINE

## 2018-10-09 ENCOUNTER — OUTSIDE FACILITY SERVICE (OUTPATIENT)
Dept: FAMILY MEDICINE CLINIC | Facility: CLINIC | Age: 83
End: 2018-10-09

## 2018-10-09 PROCEDURE — 99308 SBSQ NF CARE LOW MDM 20: CPT | Performed by: FAMILY MEDICINE

## 2018-10-11 ENCOUNTER — OUTSIDE FACILITY SERVICE (OUTPATIENT)
Dept: FAMILY MEDICINE CLINIC | Facility: CLINIC | Age: 83
End: 2018-10-11

## 2018-10-11 PROCEDURE — 99307 SBSQ NF CARE SF MDM 10: CPT | Performed by: FAMILY MEDICINE

## 2018-11-08 ENCOUNTER — OUTSIDE FACILITY SERVICE (OUTPATIENT)
Dept: FAMILY MEDICINE CLINIC | Facility: CLINIC | Age: 83
End: 2018-11-08

## 2018-11-08 PROCEDURE — 99307 SBSQ NF CARE SF MDM 10: CPT | Performed by: FAMILY MEDICINE

## 2018-12-06 ENCOUNTER — OUTSIDE FACILITY SERVICE (OUTPATIENT)
Dept: FAMILY MEDICINE CLINIC | Facility: CLINIC | Age: 83
End: 2018-12-06

## 2018-12-06 PROCEDURE — 99308 SBSQ NF CARE LOW MDM 20: CPT | Performed by: FAMILY MEDICINE

## 2018-12-13 ENCOUNTER — OUTSIDE FACILITY SERVICE (OUTPATIENT)
Dept: FAMILY MEDICINE CLINIC | Facility: CLINIC | Age: 83
End: 2018-12-13

## 2018-12-13 PROCEDURE — 99307 SBSQ NF CARE SF MDM 10: CPT | Performed by: FAMILY MEDICINE

## 2019-02-07 ENCOUNTER — OUTSIDE FACILITY SERVICE (OUTPATIENT)
Dept: FAMILY MEDICINE CLINIC | Facility: CLINIC | Age: 84
End: 2019-02-07

## 2019-02-07 PROCEDURE — 99308 SBSQ NF CARE LOW MDM 20: CPT | Performed by: FAMILY MEDICINE

## 2019-04-02 ENCOUNTER — OUTSIDE FACILITY SERVICE (OUTPATIENT)
Dept: FAMILY MEDICINE CLINIC | Facility: CLINIC | Age: 84
End: 2019-04-02

## 2019-04-02 PROCEDURE — 99309 SBSQ NF CARE MODERATE MDM 30: CPT | Performed by: FAMILY MEDICINE

## 2019-04-04 ENCOUNTER — OUTSIDE FACILITY SERVICE (OUTPATIENT)
Dept: FAMILY MEDICINE CLINIC | Facility: CLINIC | Age: 84
End: 2019-04-04

## 2019-04-04 PROCEDURE — 99307 SBSQ NF CARE SF MDM 10: CPT | Performed by: FAMILY MEDICINE

## 2019-04-11 ENCOUNTER — OUTSIDE FACILITY SERVICE (OUTPATIENT)
Dept: FAMILY MEDICINE CLINIC | Facility: CLINIC | Age: 84
End: 2019-04-11

## 2019-04-11 PROCEDURE — 99307 SBSQ NF CARE SF MDM 10: CPT | Performed by: FAMILY MEDICINE

## 2019-05-28 ENCOUNTER — OUTSIDE FACILITY SERVICE (OUTPATIENT)
Dept: FAMILY MEDICINE CLINIC | Facility: CLINIC | Age: 84
End: 2019-05-28

## 2019-05-28 PROCEDURE — 99308 SBSQ NF CARE LOW MDM 20: CPT | Performed by: FAMILY MEDICINE

## 2019-08-01 ENCOUNTER — OUTSIDE FACILITY SERVICE (OUTPATIENT)
Dept: FAMILY MEDICINE CLINIC | Facility: CLINIC | Age: 84
End: 2019-08-01

## 2019-08-01 PROCEDURE — 99308 SBSQ NF CARE LOW MDM 20: CPT | Performed by: FAMILY MEDICINE

## 2019-08-15 ENCOUNTER — OUTSIDE FACILITY SERVICE (OUTPATIENT)
Dept: FAMILY MEDICINE CLINIC | Facility: CLINIC | Age: 84
End: 2019-08-15

## 2019-08-15 PROCEDURE — 99307 SBSQ NF CARE SF MDM 10: CPT | Performed by: FAMILY MEDICINE

## 2019-10-01 ENCOUNTER — OUTSIDE FACILITY SERVICE (OUTPATIENT)
Dept: FAMILY MEDICINE CLINIC | Facility: CLINIC | Age: 84
End: 2019-10-01

## 2019-10-01 PROCEDURE — 99308 SBSQ NF CARE LOW MDM 20: CPT | Performed by: FAMILY MEDICINE

## 2019-11-14 ENCOUNTER — EPISODE CHANGES (OUTPATIENT)
Dept: CASE MANAGEMENT | Facility: OTHER | Age: 84
End: 2019-11-14

## 2019-11-14 ENCOUNTER — PATIENT OUTREACH (OUTPATIENT)
Dept: CASE MANAGEMENT | Facility: OTHER | Age: 84
End: 2019-11-14

## 2019-11-14 NOTE — OUTREACH NOTE
Care Coordination Note    Received PING notification of discharge from [facility]. Spoke with BALAJI at [facility] and she was transferred to [facility].     Neda Alejandro RN  Community Care Coordinator    11/14/2019, 11:04 AM

## 2019-11-14 NOTE — OUTREACH NOTE
Care Coordination Note    Provided   and e-mail concerning SNF update    Neda Alejandro RN  Community Care Coordinator    11/14/2019, 2:31 PM